# Patient Record
(demographics unavailable — no encounter records)

---

## 2024-10-22 NOTE — PHYSICAL EXAM
[de-identified] : No cervical or supraclavicular adenopathy, incision well healed, no palpable masses. [Normal] : no neck adenopathy [de-identified] : Skin:  normal appearance.  no rash, nodules, vesicles, or erythema,\par  Musculoskeletal:  full range of motion and no deformities appreciated\par  Neurological:  grossly intact\par  Psychiatric:  oriented to person, place and time with appropriate affect

## 2024-10-22 NOTE — HISTORY OF PRESENT ILLNESS
[de-identified] : Patient with total thyroidectomy with central neck dissection February 2012. 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 Patient was treated with 98 mCi of radioactive iodine June 2012. Ultrasound December 2014 with probable benign lymph nodes, no evidence of recurrent disease. Patient underwent atrial ablation for atrial fibrillation June 2016. Patient has not had recurrent episode of atrial fibrillation.    patient with 11-year hx of thyroid cancer, Continues on Synthroid 200. keyla 8/2023 in good range.  TG negative.  Neck ultrasound December 2021, no evidence of recurrent disease.  Patient diagnosed with MS undergoing infusions.  reports occasional palpitation with afib.  diagnosed with bells palsy, just completed steroid treatment.   reports fatigue.  I have reviewed all old and new data and available images.

## 2025-01-14 NOTE — HISTORY OF PRESENT ILLNESS
[de-identified] :  Ms. Parks is a 61 y/o female presenting today for a follow up visit.  She has a prior history of bilateral breast core biopsies in 2005 and 2021 all with benign findings. She has a family history of breast cancer involving her maternal grandmother at age 65. She developed intermittent pain in her right breast described as feeling "full/sore/tender" and states the pain has since subsided. She drinks approximately 16-20 oz of caffeinated tea daily but has done so for many years. She denies tobacco use. She notes a lump above her right nipple and states this was previously biopsied.  Her past medical history includes a-fib (on aspirin 325 mg daily), + LOOP recorder in place, thyroid cancer (s/p total thyroidectomy in 2012 followed by [facility]), osteoarthritis, HTN.  Her last menstrual period was in June 2020 and she would like to start hormone therapy with her gynecologist for troublesome vasomotor symptoms but was asked to clear this with a breast surgeon first. She ultimately decided against taking HRT.  B/L SM/US on 10/17/2024 shows unchanged masses since 2022. No evidence of malignancy. BI-RADS 2   She denies palpable breast masses, nipple discharge, skin changes, inversion or breast pain. Denies constitutional symptoms. She was recently diagnosed with MS in Sept 2022.

## 2025-01-14 NOTE — PHYSICAL EXAM
[Normal] : supple, no neck mass and thyroid not enlarged [Normal Supraclavicular Lymph Nodes] : normal supraclavicular lymph nodes [Normal Axillary Lymph Nodes] : normal axillary lymph nodes [Normal] : oriented to person, place and time, with appropriate affect [FreeTextEntry1] : SC present for exam  [de-identified] : Loop recorder in place [de-identified] : Complete breast exam performed in supine and upright positions.  No palpable masses, tenderness, nipple discharge, inversion, deviation or enlarged axillary or supraclavicular lymph nodes bilaterally. [de-identified] : Clear breath sounds bilaterally, normal respiratory effort.

## 2025-01-14 NOTE — PHYSICAL EXAM
[Normal] : supple, no neck mass and thyroid not enlarged [Normal Supraclavicular Lymph Nodes] : normal supraclavicular lymph nodes [Normal Axillary Lymph Nodes] : normal axillary lymph nodes [Normal] : oriented to person, place and time, with appropriate affect [FreeTextEntry1] : SC present for exam  [de-identified] : Loop recorder in place [de-identified] : Complete breast exam performed in supine and upright positions.  No palpable masses, tenderness, nipple discharge, inversion, deviation or enlarged axillary or supraclavicular lymph nodes bilaterally. [de-identified] : Clear breath sounds bilaterally, normal respiratory effort.

## 2025-01-14 NOTE — ASSESSMENT
[FreeTextEntry1] : IMP: -No evidence of new or recurrent lesions. -Breast imaging failed to identify any suspicious lesions. B/L M/U 10/2024: BIRADS 2  PLAN: RTO in one year B/L MMG/US 10/2025   All medical entries were at my, Dr. Nir Mcgrath, direction. I have reviewed the chart and agree that the record accurately reflects my personal performance of the history, physical exam, assessment and plan. Our office nurse practitioner was present for the duration of the office visit.

## 2025-01-14 NOTE — HISTORY OF PRESENT ILLNESS
[de-identified] :  Ms. Parks is a 59 y/o female presenting today for a follow up visit.  She has a prior history of bilateral breast core biopsies in 2005 and 2021 all with benign findings. She has a family history of breast cancer involving her maternal grandmother at age 65. She developed intermittent pain in her right breast described as feeling "full/sore/tender" and states the pain has since subsided. She drinks approximately 16-20 oz of caffeinated tea daily but has done so for many years. She denies tobacco use. She notes a lump above her right nipple and states this was previously biopsied.  Her past medical history includes a-fib (on aspirin 325 mg daily), + LOOP recorder in place, thyroid cancer (s/p total thyroidectomy in 2012 followed by [facility]), osteoarthritis, HTN.  Her last menstrual period was in June 2020 and she would like to start hormone therapy with her gynecologist for troublesome vasomotor symptoms but was asked to clear this with a breast surgeon first. She ultimately decided against taking HRT.  B/L SM/US on 10/17/2024 shows unchanged masses since 2022. No evidence of malignancy. BI-RADS 2   She denies palpable breast masses, nipple discharge, skin changes, inversion or breast pain. Denies constitutional symptoms. She was recently diagnosed with MS in Sept 2022.

## 2025-03-25 NOTE — DATA REVIEWED
[de-identified] : CSF 2022: WBC 3 protein 46 glucose 120 OCB>5 Bands   MR Brain 2022: few t2/Flair abnormality HATTIE and abutting the temporal horn  MR T spine 2022: enhancing T7-T8 right sided lesion  MR C spine 2022: No C spine lesions

## 2025-03-25 NOTE — ASSESSMENT
[FreeTextEntry1] : 60 with sensory Myelitis 2022 with MR T spine showing enhancing lesion at T7-T8, MR Brain with few HATTIE and PV lesions, CSF with +OCB all together fulfilling DIT and DIS criteria for MS. Has developed anaphylaxis reaction to Tysabri likely secondary to neutralizing antibody, then switched to Kesimpta which she couldn't tolerate due to high grade fever. She has been on Vumerity since 1/2025 but maxed on her insurance and assistance copay. Discussed switching to Tecfidera or escalating to Ocrevus. Patient would like to proceed with Ocrevus.    Discussed full benefit risk profile of including risk of infusion/injection reactions, infectious, rare serious infections, PML, possible neoplastic risk, possible effects on vaccine response, blood effects, as each risk pertains to each medication.   all questions answered, education provided, management discussed at length.  Plan: -Switch to Ocrevus (for authorization: Anaphylactic reaction to Tysabri, intolerance to Kesimpta, with high-risk disease given recent myelitis, age with late onset increasing risk of disability/progression, and incomplete recovery) -Continue Vumerity until Ocrevus approval -Will try to get addition free sample of Vumerity through [employer] for now -Pre-DMT workup -MR Brain/ c/t spine w/wo vani -Patient would like to get another opinion, has an upcoming appointment with Dr Briseno at [facility] in 3months

## 2025-03-25 NOTE — END OF VISIT
[] : Fellow [FreeTextEntry3] : 61 yo F developed R LE sensory deficits 2022 after receiving the COVID vaccine and noted to have a short segment T7/8 enhancing lesion and brain MRI with evidence of mild inactive demyelinating lesions meeting DIS and DIT criteria for diagnosis of JULIO CESAR. She was initially started on Tysabri but 9 months in developed an anaphylactic reaction and was switched to Vumerity. She had also tried Kesimpta but reports high grade fever after first shot. However, due to insurance issues pt now has a high copay on vumerity and can no longer continue therapy beyond 1 month.  Given recent relapse with insurance issues on vumerity, we discussed switching her to ocrevus vs generic Tecfidera. Discussed s/e profile at length. Pt more comfortable being on high efficacy treatment and hence will plan to move forward with ocrevus. Can plan to deescalate in the future. Still will continue vumerity for the next month until prescription runs out- may need samples until Ocrevus is approved/initiated.   - Pt has plans to travel the end of April (domestically) - [Time Spent: ___ minutes] : I have spent [unfilled] minutes of time on the encounter which excludes teaching and separately reported services.

## 2025-03-25 NOTE — END OF VISIT
[] : Fellow [FreeTextEntry3] : 59 yo F developed R LE sensory deficits 2022 after receiving the COVID vaccine and noted to have a short segment T7/8 enhancing lesion and brain MRI with evidence of mild inactive demyelinating lesions meeting DIS and DIT criteria for diagnosis of JULIO CESAR. She was initially started on Tysabri but 9 months in developed an anaphylactic reaction and was switched to Vumerity. She had also tried Kesimpta but reports high grade fever after first shot. However, due to insurance issues pt now has a high copay on vumerity and can no longer continue therapy beyond 1 month.  Given recent relapse with insurance issues on vumerity, we discussed switching her to ocrevus vs generic Tecfidera. Discussed s/e profile at length. Pt more comfortable being on high efficacy treatment and hence will plan to move forward with ocrevus. Can plan to deescalate in the future. Still will continue vumerity for the next month until prescription runs out- may need samples until Ocrevus is approved/initiated.   - Pt has plans to travel the end of April (domestically) - [Time Spent: ___ minutes] : I have spent [unfilled] minutes of time on the encounter which excludes teaching and separately reported services.

## 2025-03-25 NOTE — PHYSICAL EXAM
[FreeTextEntry1] :    Exam: AO3.  Normally conversant.  Follows commands, names, and repeats.  Good attention.   PERRL, VFF, EOMI, no nystagmus, face symmetric, TUP at midline.   Motor:                                                R:                               L: Del                                          5                               5 Bi                                            5                               5 Tri                                           5                               5 Wrist Extensors                      5                               5 Finger abductors                    5                               5                                         5                               5   HF                                           5                               5 KE                                           5                               5 KF                                           5                               5 DF                                           5                               5 PF                                           5                               5   Tone                                R                               L UE                                   0                               0 LE                                   0                                0   Sensory                 RUE/ LUE                  RLE/ LLE     LT                           +/ +                                  +/+ Vib                          +/+                     decreased/+ JPS                        +/ +                                  +/ +                   PP                          +/+                                   +/+ Temp                      +/+                   decreased/+   Reflexes:                          R                             L                            Biceps              2                             2 BR                    2                             2 Triceps             2                             2 Pat                   2                             2 AJ                    2                             2   TOES              E                            E     Coordination:                        R                             L                       FTN                0                            0 AMIE               0                            0 HTS                0                            0   Other                                                                              Gait: able to tandem gait, walks on tiptoes and heels

## 2025-03-25 NOTE — DATA REVIEWED
[de-identified] : CSF 2022: WBC 3 protein 46 glucose 120 OCB>5 Bands   MR Brain 2022: few t2/Flair abnormality HATTIE and abutting the temporal horn  MR T spine 2022: enhancing T7-T8 right sided lesion  MR C spine 2022: No C spine lesions

## 2025-03-25 NOTE — HISTORY OF PRESENT ILLNESS
[FreeTextEntry1] : Reason for consult: MS   HPI: CAROL GARCÍA is a 60 year old woman referred by Dr Badillo  2022: Received COVID vaccine, then few weeks later she developed R thoracic sensory symptoms at the bra line, worsening over few days. Symptoms gradually worsened over the days with ..... Went to [facility], she was MR T spine showing T8 lesion. CSF with + OCB. MR Brain showing small lesion. She received 3 days of IVMP with minimal improvement. Received addition steroid round for 5 days by Dr Badillo. Diagnosed her with MS. Saw another neurologist Dr Rodrigues at [facility] who confirmed the diagnosis. Went back to Dr Clemens and was started on Tysabri. She received 9 rounds of Tysabri, then developed anaphylactic reaction during infusion. She was switched to Kesimpta and developed High grade fever after the first dose, so she was switched to Vumerity. Patient was tolerating Vumerity 1/2025, but reports she is maxed on her insurance and the copay assistance program by [employer].   No new symptoms, and never had similar symptoms before  ROS/Current Sx: Right sided sensory symptoms No bowel or bladder symptoms     PMHX: MS Thyroid cancer- papillary CA s/p thyroidectomy 2012 Myomectomy HTN knee replacement surgery Afib s/p ablation   MEDS: Vumerity Synthroid 200mcg once / day Almosartan 20, 12.5 Vitamin D 5K IU daily ASA MV Fish oil   ALL: tetracycline Tysabri-    SHx: Quit smoking 1995 Social etoh hx of drug use, cocaine and marihuana Used to be a surgical technician

## 2025-03-25 NOTE — ASSESSMENT
[FreeTextEntry1] : 60 with sensory Myelitis 2022 with MR T spine showing enhancing lesion at T7-T8, MR Brain with few HATTIE and PV lesions, CSF with +OCB all together fulfilling DIT and DIS criteria for MS. Has developed anaphylaxis reaction to Tysabri likely secondary to neutralizing antibody, then switched to Kesimpta which she couldn't tolerate due to high grade fever. She has been on Vumerity since 1/2025 but maxed on her insurance and assistance copay. Discussed switching to Tecfidera or escalating to Ocrevus. Patient would like to proceed with Ocrevus.    Discussed full benefit risk profile of including risk of infusion/injection reactions, infectious, rare serious infections, PML, possible neoplastic risk, possible effects on vaccine response, blood effects, as each risk pertains to each medication.   all questions answered, education provided, management discussed at length.  Plan: -Switch to Ocrevus (for authorization: Anaphylactic reaction to Tysabri, intolerance to Kesimpta, with high-risk disease given recent myelitis, age with late onset increasing risk of disability/progression, and incomplete recovery) -Continue Vumerity until Ocrevus approval -Will try to get addition free sample of Vumerity through [employer] for now -Pre-DMT workup -MR Brain/ c/t spine w/wo vani -Patient would like to get another opinion, has an upcoming appointment with Dr Briseno at [facility] in 3months

## 2025-03-25 NOTE — HISTORY OF PRESENT ILLNESS
[FreeTextEntry1] : Reason for consult: MS   HPI: CAROL GARCÍA is a 60 year old woman referred by Dr Badillo  2022: Received COVID vaccine, then few weeks later she developed R thoracic sensory symptoms at the bra line, worsening over few days. Symptoms gradually worsened over the days with ..... Went to [facility], she was MR T spine showing T8 lesion. CSF with + OCB. MR Brain showing small lesion. She received 3 days of IVMP with minimal improvement. Received addition steroid round for 5 days by Dr Badillo. Diagnosed her with MS. Saw another neurologist Dr Rodrigues at [facility] who confirmed the diagnosis. Went back to Dr Clemens and was started on Tysabri. She received 9 rounds of Tysabri, then developed anaphylactic reaction during infusion. She was switched to Kesimpta and developed High grade fever after the first dose, so she was switched to Vumerity. Patient was tolerating Vumerity 1/2025, but reports she is maxed on her insurance and the copay assistance program by [employer].   No new symptoms, and never had similar symptoms before  ROS/Current Sx: Right sided sensory symptoms No bowel or bladder symptoms     PMHX: MS Thyroid cancer- papillary CA s/p thyroidectomy 2012 Myomectomy HTN knee replacement surgery Afib s/p ablation   MEDS: Vumerity Synthroid 200mcg once / day Almosartan 20, 12.5 Vitamin D 5K IU daily ASA MV Fish oil   ALL: tetracycline Tysabri-    SHx: Quit smoking 1995 Social etoh hx of drug use, cocaine and marihuana Used to be a surgical technician